# Patient Record
Sex: FEMALE | Race: WHITE | Employment: UNEMPLOYED | URBAN - METROPOLITAN AREA
[De-identification: names, ages, dates, MRNs, and addresses within clinical notes are randomized per-mention and may not be internally consistent; named-entity substitution may affect disease eponyms.]

---

## 2017-07-28 PROBLEM — S42.309A CLOSED FRACTURE OF HUMERUS: Status: ACTIVE | Noted: 2017-07-28

## 2017-07-28 PROBLEM — S42.401A ELBOW FRACTURE, RIGHT: Status: ACTIVE | Noted: 2017-07-28

## 2017-11-27 PROBLEM — R20.2 NUMBNESS AND TINGLING IN RIGHT HAND: Status: ACTIVE | Noted: 2017-11-27

## 2017-11-27 PROBLEM — R20.0 NUMBNESS AND TINGLING IN RIGHT HAND: Status: ACTIVE | Noted: 2017-11-27

## 2019-01-29 DIAGNOSIS — S42.401S CLOSED FRACTURE OF RIGHT ELBOW, SEQUELA: Primary | ICD-10-CM

## 2019-01-29 DIAGNOSIS — S42.201S CLOSED FRACTURE OF PROXIMAL END OF RIGHT HUMERUS, UNSPECIFIED FRACTURE MORPHOLOGY, SEQUELA: ICD-10-CM

## 2019-01-31 ENCOUNTER — HOSPITAL ENCOUNTER (OUTPATIENT)
Dept: GENERAL RADIOLOGY | Age: 49
Discharge: HOME | End: 2019-02-02
Payer: MEDICAID

## 2019-01-31 ENCOUNTER — OFFICE VISIT (OUTPATIENT)
Dept: ORTHOPEDIC SURGERY | Age: 49
End: 2019-01-31
Payer: MEDICAID

## 2019-01-31 VITALS
BODY MASS INDEX: 38.64 KG/M2 | HEART RATE: 88 BPM | WEIGHT: 210 LBS | HEIGHT: 62 IN | DIASTOLIC BLOOD PRESSURE: 84 MMHG | SYSTOLIC BLOOD PRESSURE: 136 MMHG

## 2019-01-31 DIAGNOSIS — S42.201S CLOSED FRACTURE OF PROXIMAL END OF RIGHT HUMERUS, UNSPECIFIED FRACTURE MORPHOLOGY, SEQUELA: ICD-10-CM

## 2019-01-31 DIAGNOSIS — S42.201D CLOSED FRACTURE OF PROXIMAL END OF RIGHT HUMERUS WITH ROUTINE HEALING, UNSPECIFIED FRACTURE MORPHOLOGY, SUBSEQUENT ENCOUNTER: ICD-10-CM

## 2019-01-31 DIAGNOSIS — S42.401S CLOSED FRACTURE OF RIGHT ELBOW, SEQUELA: ICD-10-CM

## 2019-01-31 DIAGNOSIS — S42.401D CLOSED FRACTURE OF RIGHT ELBOW WITH ROUTINE HEALING, SUBSEQUENT ENCOUNTER: ICD-10-CM

## 2019-01-31 PROCEDURE — 99212 OFFICE O/P EST SF 10 MIN: CPT | Performed by: PHYSICIAN ASSISTANT

## 2019-01-31 PROCEDURE — 99212 OFFICE O/P EST SF 10 MIN: CPT

## 2019-01-31 PROCEDURE — 73080 X-RAY EXAM OF ELBOW: CPT

## 2019-01-31 PROCEDURE — 73060 X-RAY EXAM OF HUMERUS: CPT

## 2019-01-31 PROCEDURE — G8427 DOCREV CUR MEDS BY ELIG CLIN: HCPCS | Performed by: PHYSICIAN ASSISTANT

## 2019-01-31 PROCEDURE — G8417 CALC BMI ABV UP PARAM F/U: HCPCS | Performed by: PHYSICIAN ASSISTANT

## 2019-01-31 PROCEDURE — G8484 FLU IMMUNIZE NO ADMIN: HCPCS | Performed by: PHYSICIAN ASSISTANT

## 2019-01-31 PROCEDURE — 4004F PT TOBACCO SCREEN RCVD TLK: CPT | Performed by: PHYSICIAN ASSISTANT

## 2020-05-18 ENCOUNTER — TELEPHONE (OUTPATIENT)
Dept: ORTHOPEDIC SURGERY | Age: 50
End: 2020-05-18

## 2020-06-15 RX ORDER — CREAM BASE NO.39
CREAM (GRAM) TOPICAL
Qty: 1 BOTTLE | Refills: 0 | OUTPATIENT
Start: 2020-06-15

## 2020-06-15 RX ORDER — LIDOCAINE 50 MG/G
OINTMENT TOPICAL
Qty: 50 G | Refills: 0 | OUTPATIENT
Start: 2020-06-15

## 2020-06-15 NOTE — TELEPHONE ENCOUNTER
[facility] requesting prescription for compounding cream.    Last seen in office 01/31/19. No future appointments. Per note on 05/18/20, defer to PCP. Call placed to [facility]. Spoke to Sekou. Advised of refusal reason.

## 2020-10-06 ENCOUNTER — HOSPITAL ENCOUNTER (EMERGENCY)
Age: 50
Discharge: HOME | End: 2020-10-06
Attending: EMERGENCY MEDICINE
Payer: MEDICAID

## 2020-10-06 VITALS
HEART RATE: 98 BPM | OXYGEN SATURATION: 97 % | SYSTOLIC BLOOD PRESSURE: 145 MMHG | BODY MASS INDEX: 38.41 KG/M2 | HEIGHT: 62 IN | RESPIRATION RATE: 16 BRPM | TEMPERATURE: 98 F | DIASTOLIC BLOOD PRESSURE: 90 MMHG

## 2020-10-06 LAB
CHP ED QC CHECK: YES
GLUCOSE BLD-MCNC: 166 MG/DL
METER GLUCOSE: 166 MG/DL (ref 74–99)

## 2020-10-06 PROCEDURE — 82962 GLUCOSE BLOOD TEST: CPT

## 2020-10-06 PROCEDURE — 99283 EMERGENCY DEPT VISIT LOW MDM: CPT

## 2020-10-06 PROCEDURE — 96374 THER/PROPH/DIAG INJ IV PUSH: CPT

## 2020-10-06 PROCEDURE — 6360000002 HC RX W HCPCS: Performed by: EMERGENCY MEDICINE

## 2020-10-06 RX ORDER — NALOXONE HYDROCHLORIDE 0.4 MG/ML
1 INJECTION, SOLUTION INTRAMUSCULAR; INTRAVENOUS; SUBCUTANEOUS ONCE
Status: COMPLETED | OUTPATIENT
Start: 2020-10-06 | End: 2020-10-06

## 2020-10-06 RX ADMIN — NALOXONE HYDROCHLORIDE 1 MG: 0.4 INJECTION, SOLUTION INTRAMUSCULAR; INTRAVENOUS; SUBCUTANEOUS at 18:36

## 2020-10-07 NOTE — ED PROVIDER NOTES
EXAM--------------------------------------    Constitutional/General: Alert and oriented x3  Head: Normocephalic and atraumatic  Eyes: PERRL, EOMI, sclera non icteric  Mouth: Oropharynx clear, handling secretions, no trismus, no asymmetry of the posterior oropharynx or uvular edema  Neck: Supple, full ROM, no stridor, no meningeal signs  Respiratory: Lungs clear to auscultation bilaterally, no wheezes, rales, or rhonchi. Not in respiratory distress  Cardiovascular:  Regular rate. Regular rhythm. 2+ distal pulses. Equal extremity pulses. Chest: No chest wall tenderness  GI:  Abdomen Soft, Non tender, Non distended. No rebound, guarding, or rigidity. No pulsatile masses. Musculoskeletal: Moves all extremities x 4. Warm and well perfused, no clubbing, cyanosis, or edema. Capillary refill <3 seconds  Integument: skin warm and dry. No rashes. Neurologic: GCS 15, no focal deficits, symmetric strength 5/5 in the upper and lower extremities bilaterally  Psychiatric: Normal Affect          -------------------------------------------------- RESULTS -------------------------------------------------  I have personally reviewed all laboratory and imaging results for this patient. Results are listed below. LABS: (Lab results interpreted by me)  Results for orders placed or performed during the hospital encounter of 10/06/20   POCT Glucose   Result Value Ref Range    Glucose 166 mg/dL    QC OK?  yes    POCT Glucose   Result Value Ref Range    Meter Glucose 166 (H) 74 - 99 mg/dL   ,       RADIOLOGY:  Interpreted by Radiologist unless otherwise specified  No orders to display         EKG Interpretation  Interpreted by emergency department physician, Dr. Gabriel Tello         ------------------------- NURSING NOTES AND VITALS REVIEWED ---------------------------   The nursing notes within the ED encounter and vital signs as below have been reviewed by myself  BP (!) 184/114   Pulse 83   Temp 97.8 °F (36.6 °C)   Resp 16   Ht 5' 2\" (1.575 m)   SpO2 94%   BMI 38.41 kg/m²     Oxygen Saturation Interpretation: Normal    The patients available past medical records and past encounters were reviewed. ------------------------------ ED COURSE/MEDICAL DECISION MAKING----------------------  Medications   naloxone (NARCAN) injection 1 mg (1 mg Intravenous Given 10/6/20 1836)           The cardiac monitor revealed sinus with a heart rate in the 80s as interpreted by me. The cardiac monitor was ordered secondary to the patient's OVERDOSE and to monitor the patient for dysrhythmia. [facility] [id number]         Medical Decision Making:    Shortly after arrival, the patient became lethargic once again. Received 1 mg IV Narcan and responded. She was monitored for over an hour and a half. On reevaluation, patient is resting, awake and alert, no symptoms or complaints. She like to go home. She denies suicidal or homicidal ideation, she says his overdose was accidental.  Patient will be discharged, she is to follow-up with her PCP in 1 to 2 days. She is educated on signs and symptoms of require emergent evaluation. Critical care time: 33 minutes      Counseling: The emergency provider has spoken with the patient and discussed todays results, in addition to providing specific details for the plan of care and counseling regarding the diagnosis and prognosis. Questions are answered at this time and they are agreeable with the plan.       --------------------------------- IMPRESSION AND DISPOSITION ---------------------------------    IMPRESSION  1. Accidental drug overdose, initial encounter        DISPOSITION  Disposition: Discharge to home  Patient condition is stable        NOTE: This report was transcribed using voice recognition software.  Every effort was made to ensure accuracy; however, inadvertent computerized transcription errors may be present       Alejandra Galvez MD  10/07/20 1748

## 2024-01-13 ENCOUNTER — HOSPITAL ENCOUNTER (EMERGENCY)
Age: 54
Discharge: HOME | End: 2024-01-13
Payer: MEDICAID

## 2024-01-13 ENCOUNTER — APPOINTMENT (OUTPATIENT)
Dept: GENERAL RADIOLOGY | Age: 54
End: 2024-01-13
Payer: MEDICAID

## 2024-01-13 VITALS
TEMPERATURE: 97.7 F | RESPIRATION RATE: 18 BRPM | WEIGHT: 239 LBS | OXYGEN SATURATION: 94 % | SYSTOLIC BLOOD PRESSURE: 154 MMHG | BODY MASS INDEX: 43.98 KG/M2 | HEIGHT: 62 IN | HEART RATE: 89 BPM | DIASTOLIC BLOOD PRESSURE: 90 MMHG

## 2024-01-13 DIAGNOSIS — J40 BRONCHITIS: ICD-10-CM

## 2024-01-13 DIAGNOSIS — J06.9 VIRAL URI WITH COUGH: Primary | ICD-10-CM

## 2024-01-13 LAB
FLUAV RNA RESP QL NAA+PROBE: NOT DETECTED
FLUBV RNA RESP QL NAA+PROBE: NOT DETECTED
RSV BY PCR: NOT DETECTED
SARS-COV-2 RNA RESP QL NAA+PROBE: NOT DETECTED
SOURCE: NORMAL
SPECIMEN DESCRIPTION: NORMAL
SPECIMEN SOURCE: NORMAL

## 2024-01-13 PROCEDURE — 87636 SARSCOV2 & INF A&B AMP PRB: CPT

## 2024-01-13 PROCEDURE — 99284 EMERGENCY DEPT VISIT MOD MDM: CPT

## 2024-01-13 PROCEDURE — 87634 RSV DNA/RNA AMP PROBE: CPT

## 2024-01-13 PROCEDURE — 71045 X-RAY EXAM CHEST 1 VIEW: CPT

## 2024-01-13 RX ORDER — METHYLPREDNISOLONE 4 MG/1
TABLET ORAL
Qty: 21 TABLET | Refills: 0 | Status: SHIPPED | OUTPATIENT
Start: 2024-01-13 | End: 2024-01-19

## 2024-01-13 RX ORDER — BROMPHENIRAMINE MALEATE, PSEUDOEPHEDRINE HYDROCHLORIDE, AND DEXTROMETHORPHAN HYDROBROMIDE 2; 30; 10 MG/5ML; MG/5ML; MG/5ML
5 SYRUP ORAL 4 TIMES DAILY PRN
Qty: 140 ML | Refills: 0 | Status: SHIPPED | OUTPATIENT
Start: 2024-01-13 | End: 2024-01-20

## 2024-01-13 RX ORDER — ALBUTEROL SULFATE 90 UG/1
2 AEROSOL, METERED RESPIRATORY (INHALATION) 4 TIMES DAILY PRN
Qty: 18 G | Refills: 0 | Status: SHIPPED | OUTPATIENT
Start: 2024-01-13

## 2024-01-13 ASSESSMENT — PAIN - FUNCTIONAL ASSESSMENT
PAIN_FUNCTIONAL_ASSESSMENT: 0-10
PAIN_FUNCTIONAL_ASSESSMENT: NONE - DENIES PAIN

## 2024-01-13 ASSESSMENT — PAIN DESCRIPTION - PAIN TYPE: TYPE: ACUTE PAIN

## 2024-01-13 ASSESSMENT — PAIN DESCRIPTION - FREQUENCY: FREQUENCY: CONTINUOUS

## 2024-01-13 ASSESSMENT — PAIN DESCRIPTION - DESCRIPTORS: DESCRIPTORS: ACHING;DISCOMFORT

## 2024-01-13 ASSESSMENT — PAIN SCALES - GENERAL: PAINLEVEL_OUTOF10: 8

## 2024-01-13 ASSESSMENT — PAIN DESCRIPTION - LOCATION: LOCATION: THROAT;GENERALIZED

## 2024-01-13 ASSESSMENT — PAIN DESCRIPTION - ONSET: ONSET: SUDDEN

## 2024-01-13 NOTE — ED PROVIDER NOTES
Independent MELONY Visit.        [facility]  Department of Emergency Medicine   ED  Encounter Note  Admit Date/RoomTime: 2024  4:22 PM  ED Room:     NAME: Merna Kelley  : 1970  MRN: [id number]     Chief Complaint:  Influenza (Sore throat, vomiting, body aches, chills, fever, right ear pain.  Started yesterday. )    History of Present Illness       Merna Kelley is a 53 y.o. old female who presents to the emergency department by private vehicle with complaints of a 1 day history of cough, sore throat, vomiting, body aches, chills, fever, Tmax 102 °F.  She is also had a mild cough and right ear pain.  She took some Tylenol over-the-counter for symptoms with minimal relief.  Denies any known sick contacts.  She denies any chest pain, shortness of breath, palpitations.  She does endorse a current everyday smoking habit.  Rates her symptoms as mild to moderate.  ROS   Pertinent positives and negatives are stated within HPI, all other systems reviewed and are negative.    Past Medical History:  has a past medical history of Cervical dysplasia.    Surgical History:  has a past surgical history that includes Cervix surgery; hernia repair; and fracture surgery (Right, 2017).    Social History:  reports that she has been smoking cigarettes. She has never used smokeless tobacco. She reports that she does not drink alcohol and does not use drugs.    Family History: family history is not on file.     Allergies: Pcn [penicillins]    Physical Exam   Oxygen Saturation Interpretation: Normal on room air analysis.        ED Triage Vitals   BP Temp Temp Source Pulse Respirations SpO2 Height Weight - Scale   24 1614 24 1614 24 1614 24 1614 24 1614 24 1614 24 1625 24 1625   (!) 154/90 97.7 °F (36.5 °C) Oral 89 18 94 % 1.575 m (5' 2\") 108.4 kg (239 lb)         Constitutional:  Alert, development consistent with age.  Ears:  External Ears:  she will be started on a short course of oral steroids, Bromfed-DM and albuterol inhaler.  Discussed strict return to ER precautions    Patient was explicitly instructed on specific signs and symptoms on which to return to the emergency room for. Patient was instructed to return to the ER for any new or worsening symptoms. Additional discharge instructions were given verbally. All questions were answered. Patient is comfortable and agreeable with discharge plan. Patient in no acute distress and non-toxic in appearance.        Assessment     1. Viral URI with cough    2. Bronchitis      Plan   Discharged home.  Patient condition is stable    [facility] [place] [id number] [place] [id number]  [phone]  Schedule an appointment as soon as possible for a visit in 1 week         New Medications     New Prescriptions    ALBUTEROL SULFATE HFA (VENTOLIN HFA) 108 (90 BASE) MCG/ACT INHALER    Inhale 2 puffs into the lungs 4 times daily as needed for Wheezing    BROMPHENIRAMINE-PSEUDOEPHEDRINE-DM 2-30-10 MG/5ML SYRUP    Take 5 mLs by mouth 4 times daily as needed for Congestion or Cough    METHYLPREDNISOLONE (MEDROL, WESTLEY,) 4 MG TABLET    Take by mouth.     Electronically signed by JUANJO Medina CNP   DD: 1/13/24  **This report was transcribed using voice recognition software. Every effort was made to ensure accuracy; however, inadvertent computerized transcription errors may be present.  END OF ED PROVIDER NOTE

## 2024-01-13 NOTE — DISCHARGE INSTR - COC
Continuity of Care Form    Patient Name: Merna Kelley   :  1970  MRN:  [id number]    Admit date:  2024  Discharge date:  ***    Code Status Order: Prior   Advance Directives:     Admitting Physician:  No admitting provider for patient encounter.  PCP: No primary care provider on file.    Discharging Nurse: ***  Discharging Hospital Unit/Room#: [facility] ANNE  Discharging Unit Phone Number: ***    Emergency Contact:   Extended Emergency Contact Information  Primary Emergency Contact: Jade Escobar   [facility]  Home Phone: [phone]  Relation: Other  Secondary Emergency Contact: None,Per Pt  Relation: Other    Past Surgical History:  Past Surgical History:   Procedure Laterality Date    CERVIX SURGERY      FRACTURE SURGERY Right 2017    ORIF right humerus, right radial head replacement/Dr. Rabago    HERNIA REPAIR         Immunization History:   Immunization History   Administered Date(s) Administered    TDaP, ADACEL (age 10y-64y), BOOSTRIX (age 10y+), IM, 0.5mL 2017       Active Problems:  Patient Active Problem List   Diagnosis Code    Elbow fracture, right S42.401A    Closed fracture of humerus S42.309A    Numbness and tingling in right hand R20.0, R20.2       Isolation/Infection:   Isolation            No Isolation          Patient Infection Status       None to display                     Nurse Assessment:  Last Vital Signs: BP (!) 154/90   Pulse 89   Temp 97.7 °F (36.5 °C) (Oral)   Resp 18   Ht 1.575 m (5' 2\")   Wt 108.4 kg (239 lb)   SpO2 94%   BMI 43.71 kg/m²     Last documented pain score (0-10 scale): Pain Level: 8  Last Weight:   Wt Readings from Last 1 Encounters:   24 108.4 kg (239 lb)     Mental Status:  {IP PT MENTAL STATUS:48149}    IV Access:  {[facility] IV ACCESS:663068186}    Nursing Mobility/ADLs:  Walking   {P DME ADLs:904033850}  Transfer  {P DME ADLs:234180813}  Bathing  {P DME ADLs:820646494}  Dressing  {CHP DME ADLs:638693924}  Toileting  {P

## 2024-01-13 NOTE — DISCHARGE INSTRUCTIONS
COVID, flu, RSV testing was all negative.  Your chest x-ray is showing some mild hyperinflation which may reflect an early COPD.  I do stress the importance of stopping smoking.  There is no indication for an antibiotic at this time.  Please follow-up with your PCP or the family medicine clinic in 1 week.  Medrol Dosepak as directed, Bromfed-DM as needed for cough and congestion, albuterol inhaler every 4-6 hours as needed for wheezing.

## 2024-03-20 ENCOUNTER — HOSPITAL ENCOUNTER (EMERGENCY)
Age: 54
Discharge: LEFT BEFORE DISCHARGE | End: 2024-03-21
Payer: MEDICAID

## 2024-03-20 ENCOUNTER — APPOINTMENT (OUTPATIENT)
Dept: GENERAL RADIOLOGY | Age: 54
End: 2024-03-20
Payer: MEDICAID

## 2024-03-20 VITALS
TEMPERATURE: 97.5 F | SYSTOLIC BLOOD PRESSURE: 173 MMHG | WEIGHT: 239 LBS | OXYGEN SATURATION: 100 % | HEART RATE: 64 BPM | DIASTOLIC BLOOD PRESSURE: 119 MMHG | BODY MASS INDEX: 43.71 KG/M2 | RESPIRATION RATE: 16 BRPM

## 2024-03-20 DIAGNOSIS — M25.571 ACUTE RIGHT ANKLE PAIN: Primary | ICD-10-CM

## 2024-03-20 PROCEDURE — 99281 EMR DPT VST MAYX REQ PHY/QHP: CPT

## 2024-03-20 NOTE — ED PROVIDER NOTES
Independent MELONY Visit.    HPI:  3/20/24,   Time: 2:30 PM EDT         Merna Kelley is a 53 y.o. female presenting to the ED for right ankle pain.  Patient had a mechanical fall yesterday in her ankle inverted.  She describes the pain as sharp and shooting.  Increased pain and swelling since.  Still ambulatory.  No obvious deformity on initial exam.  Has been taking Tylenol with minimal relief.  Walking on it makes it worse and resting it makes it better.  Patient denies head injury or loss consciousness.  She denies fever, chills, body aches, headache, dizziness, syncope, chest pain, shortness of breath, abdominal pain, nausea, vomiting or diarrhea.    ROS:   Pertinent positives and negatives are stated within HPI, all other systems reviewed and are negative.  --------------------------------------------- PAST HISTORY ---------------------------------------------  Past Medical History:  has a past medical history of Cervical dysplasia.    Past Surgical History:  has a past surgical history that includes Cervix surgery; hernia repair; and fracture surgery (Right, 08/07/2017).    Social History:  reports that she has been smoking cigarettes. She has never used smokeless tobacco. She reports that she does not drink alcohol and does not use drugs.    Family History: family history is not on file.     The patient’s home medications have been reviewed.    Allergies: Pcn [penicillins]    -------------------------------------------------- RESULTS -------------------------------------------------  All laboratory and radiology results have been personally reviewed by myself   LABS:  No results found for this visit on 03/20/24.    RADIOLOGY:  Interpreted by Radiologist.  XR ANKLE RIGHT (MIN 3 VIEWS)    (Results Pending)   XR TIBIA FIBULA RIGHT (2 VIEWS)    (Results Pending)       ------------------------- NURSING NOTES AND VITALS REVIEWED ---------------------------   The nursing notes within the ED encounter and vital

## 2024-03-21 ENCOUNTER — HOSPITAL ENCOUNTER (EMERGENCY)
Age: 54
Discharge: HOME | End: 2024-03-21
Payer: MEDICAID

## 2024-03-21 ENCOUNTER — APPOINTMENT (OUTPATIENT)
Dept: GENERAL RADIOLOGY | Age: 54
End: 2024-03-21
Payer: MEDICAID

## 2024-03-21 VITALS
WEIGHT: 239 LBS | TEMPERATURE: 98.1 F | SYSTOLIC BLOOD PRESSURE: 165 MMHG | DIASTOLIC BLOOD PRESSURE: 84 MMHG | OXYGEN SATURATION: 97 % | BODY MASS INDEX: 43.98 KG/M2 | RESPIRATION RATE: 16 BRPM | HEIGHT: 62 IN | HEART RATE: 66 BPM

## 2024-03-21 DIAGNOSIS — S92.101A CLOSED NONDISPLACED FRACTURE OF RIGHT TALUS, UNSPECIFIED PORTION OF TALUS, INITIAL ENCOUNTER: Primary | ICD-10-CM

## 2024-03-21 DIAGNOSIS — M25.571 ACUTE RIGHT ANKLE PAIN: ICD-10-CM

## 2024-03-21 PROCEDURE — 73590 X-RAY EXAM OF LOWER LEG: CPT

## 2024-03-21 PROCEDURE — 73630 X-RAY EXAM OF FOOT: CPT

## 2024-03-21 PROCEDURE — 99283 EMERGENCY DEPT VISIT LOW MDM: CPT

## 2024-03-21 PROCEDURE — 73610 X-RAY EXAM OF ANKLE: CPT

## 2024-03-21 PROCEDURE — 6370000000 HC RX 637 (ALT 250 FOR IP): Performed by: PHYSICIAN ASSISTANT

## 2024-03-21 RX ORDER — NAPROXEN 500 MG/1
500 TABLET ORAL 2 TIMES DAILY
Qty: 60 TABLET | Refills: 0 | Status: SHIPPED | OUTPATIENT
Start: 2024-03-21 | End: 2024-03-21

## 2024-03-21 RX ORDER — ACETAMINOPHEN 500 MG
500 TABLET ORAL EVERY 6 HOURS PRN
Qty: 30 TABLET | Refills: 0 | Status: SHIPPED | OUTPATIENT
Start: 2024-03-21

## 2024-03-21 RX ORDER — NAPROXEN 250 MG/1
500 TABLET ORAL ONCE
Status: COMPLETED | OUTPATIENT
Start: 2024-03-21 | End: 2024-03-21

## 2024-03-21 RX ADMIN — NAPROXEN 500 MG: 250 TABLET ORAL at 10:03

## 2024-03-21 NOTE — ED PROVIDER NOTES
were stable and they were in no distress at discharge. Findings were discussed with the patient and reasons to immediately return to the ED were articulated to them.     I used an evidence-based tool along with my training and experience to weigh the risk of discharge against the risks of further testing, imaging, or hospitalization. At this time, I estimate the risks of additional testing, imaging, or hospitalization to be equal to or greater than the risk of discharge.  I discussed my risk assessment with the patient and the patient consents to the risk of discharge as well as the risk of uncertainty in estimating outcomes. At this time, the risk of acute decompensation with death before the patient can return for re-evaluation is most likely lower than the risk of death attributable to being in the hospital.    Patient will follow-up with their PMD and ortho     DISPOSITION CONSIDERATIONS:    Disposition Considerations:  (include Tests not done, Shared Decision Making, Pt Expectation of Test or Tx.):   Films were obtained based on low suspicion for bony injury as per history/physical findings. Plan is subsequently for symptom control, limited use and  immobilization with appropriate outpatient follow-up.      Social Determinants:  Social Determinants : None    MEDICATIONS:   DISCHARGE MEDICATIONS:  New Prescriptions    ACETAMINOPHEN (TYLENOL) 500 MG TABLET    Take 1 tablet by mouth every 6 hours as needed for Pain     DISCONTINUED MEDICATIONS:  Discontinued Medications    No medications on file     DIAGNOSIS:     1. Closed nondisplaced fracture of right talus, unspecified portion of talus, initial encounter    2. Acute right ankle pain      This patient's ED course included: a personal history and physicial examination  This patient has remained hemodynamically stable during their ED course.    DISPOSITION:   Discharge to home.  Patient condition is good.    Discharge Instructions:   Patient referred to  Erick